# Patient Record
Sex: FEMALE | Race: OTHER | HISPANIC OR LATINO | URBAN - METROPOLITAN AREA
[De-identification: names, ages, dates, MRNs, and addresses within clinical notes are randomized per-mention and may not be internally consistent; named-entity substitution may affect disease eponyms.]

---

## 2022-04-12 ENCOUNTER — EMERGENCY (EMERGENCY)
Facility: HOSPITAL | Age: 42
LOS: 0 days | Discharge: HOME | End: 2022-04-12
Attending: EMERGENCY MEDICINE | Admitting: EMERGENCY MEDICINE
Payer: COMMERCIAL

## 2022-04-12 VITALS
OXYGEN SATURATION: 99 % | TEMPERATURE: 98 F | DIASTOLIC BLOOD PRESSURE: 67 MMHG | HEIGHT: 68 IN | HEART RATE: 87 BPM | RESPIRATION RATE: 18 BRPM | SYSTOLIC BLOOD PRESSURE: 114 MMHG | WEIGHT: 179.9 LBS

## 2022-04-12 DIAGNOSIS — Z90.710 ACQUIRED ABSENCE OF BOTH CERVIX AND UTERUS: Chronic | ICD-10-CM

## 2022-04-12 DIAGNOSIS — M54.50 LOW BACK PAIN, UNSPECIFIED: ICD-10-CM

## 2022-04-12 DIAGNOSIS — Z90.710 ACQUIRED ABSENCE OF BOTH CERVIX AND UTERUS: ICD-10-CM

## 2022-04-12 DIAGNOSIS — Z98.891 HISTORY OF UTERINE SCAR FROM PREVIOUS SURGERY: Chronic | ICD-10-CM

## 2022-04-12 LAB
ALBUMIN SERPL ELPH-MCNC: 4.6 G/DL — SIGNIFICANT CHANGE UP (ref 3.5–5.2)
ALP SERPL-CCNC: 99 U/L — SIGNIFICANT CHANGE UP (ref 30–115)
ALT FLD-CCNC: 17 U/L — SIGNIFICANT CHANGE UP (ref 0–41)
ANION GAP SERPL CALC-SCNC: 10 MMOL/L — SIGNIFICANT CHANGE UP (ref 7–14)
APPEARANCE UR: CLEAR — SIGNIFICANT CHANGE UP
AST SERPL-CCNC: 26 U/L — SIGNIFICANT CHANGE UP (ref 0–41)
BASOPHILS # BLD AUTO: 0.05 K/UL — SIGNIFICANT CHANGE UP (ref 0–0.2)
BASOPHILS NFR BLD AUTO: 0.5 % — SIGNIFICANT CHANGE UP (ref 0–1)
BILIRUB SERPL-MCNC: 0.3 MG/DL — SIGNIFICANT CHANGE UP (ref 0.2–1.2)
BILIRUB UR-MCNC: NEGATIVE — SIGNIFICANT CHANGE UP
BUN SERPL-MCNC: 8 MG/DL — LOW (ref 10–20)
CALCIUM SERPL-MCNC: 10.1 MG/DL — SIGNIFICANT CHANGE UP (ref 8.5–10.1)
CHLORIDE SERPL-SCNC: 102 MMOL/L — SIGNIFICANT CHANGE UP (ref 98–110)
CO2 SERPL-SCNC: 26 MMOL/L — SIGNIFICANT CHANGE UP (ref 17–32)
COLOR SPEC: YELLOW — SIGNIFICANT CHANGE UP
CREAT SERPL-MCNC: 0.7 MG/DL — SIGNIFICANT CHANGE UP (ref 0.7–1.5)
DIFF PNL FLD: ABNORMAL
EGFR: 111 ML/MIN/1.73M2 — SIGNIFICANT CHANGE UP
EOSINOPHIL # BLD AUTO: 0.09 K/UL — SIGNIFICANT CHANGE UP (ref 0–0.7)
EOSINOPHIL NFR BLD AUTO: 0.9 % — SIGNIFICANT CHANGE UP (ref 0–8)
EPI CELLS # UR: 7 /HPF — HIGH (ref 0–5)
GLUCOSE SERPL-MCNC: 101 MG/DL — HIGH (ref 70–99)
GLUCOSE UR QL: NEGATIVE — SIGNIFICANT CHANGE UP
HCT VFR BLD CALC: 42.9 % — SIGNIFICANT CHANGE UP (ref 37–47)
HGB BLD-MCNC: 14 G/DL — SIGNIFICANT CHANGE UP (ref 12–16)
IMM GRANULOCYTES NFR BLD AUTO: 0.2 % — SIGNIFICANT CHANGE UP (ref 0.1–0.3)
KETONES UR-MCNC: NEGATIVE — SIGNIFICANT CHANGE UP
LACTATE SERPL-SCNC: 2.6 MMOL/L — HIGH (ref 0.7–2)
LEUKOCYTE ESTERASE UR-ACNC: NEGATIVE — SIGNIFICANT CHANGE UP
LIDOCAIN IGE QN: 21 U/L — SIGNIFICANT CHANGE UP (ref 7–60)
LYMPHOCYTES # BLD AUTO: 2.25 K/UL — SIGNIFICANT CHANGE UP (ref 1.2–3.4)
LYMPHOCYTES # BLD AUTO: 22 % — SIGNIFICANT CHANGE UP (ref 20.5–51.1)
MCHC RBC-ENTMCNC: 28.3 PG — SIGNIFICANT CHANGE UP (ref 27–31)
MCHC RBC-ENTMCNC: 32.6 G/DL — SIGNIFICANT CHANGE UP (ref 32–37)
MCV RBC AUTO: 86.7 FL — SIGNIFICANT CHANGE UP (ref 81–99)
MONOCYTES # BLD AUTO: 0.8 K/UL — HIGH (ref 0.1–0.6)
MONOCYTES NFR BLD AUTO: 7.8 % — SIGNIFICANT CHANGE UP (ref 1.7–9.3)
NEUTROPHILS # BLD AUTO: 7.02 K/UL — HIGH (ref 1.4–6.5)
NEUTROPHILS NFR BLD AUTO: 68.6 % — SIGNIFICANT CHANGE UP (ref 42.2–75.2)
NITRITE UR-MCNC: NEGATIVE — SIGNIFICANT CHANGE UP
NRBC # BLD: 0 /100 WBCS — SIGNIFICANT CHANGE UP (ref 0–0)
PH UR: 6.5 — SIGNIFICANT CHANGE UP (ref 5–8)
PLATELET # BLD AUTO: 331 K/UL — SIGNIFICANT CHANGE UP (ref 130–400)
POTASSIUM SERPL-MCNC: 5.7 MMOL/L — HIGH (ref 3.5–5)
POTASSIUM SERPL-SCNC: 5.7 MMOL/L — HIGH (ref 3.5–5)
PROT SERPL-MCNC: 7.9 G/DL — SIGNIFICANT CHANGE UP (ref 6–8)
PROT UR-MCNC: SIGNIFICANT CHANGE UP
RBC # BLD: 4.95 M/UL — SIGNIFICANT CHANGE UP (ref 4.2–5.4)
RBC # FLD: 13 % — SIGNIFICANT CHANGE UP (ref 11.5–14.5)
RBC CASTS # UR COMP ASSIST: 1 /HPF — SIGNIFICANT CHANGE UP (ref 0–4)
SODIUM SERPL-SCNC: 138 MMOL/L — SIGNIFICANT CHANGE UP (ref 135–146)
SP GR SPEC: 1.01 — SIGNIFICANT CHANGE UP (ref 1.01–1.03)
UROBILINOGEN FLD QL: SIGNIFICANT CHANGE UP
WBC # BLD: 10.23 K/UL — SIGNIFICANT CHANGE UP (ref 4.8–10.8)
WBC # FLD AUTO: 10.23 K/UL — SIGNIFICANT CHANGE UP (ref 4.8–10.8)

## 2022-04-12 PROCEDURE — 99285 EMERGENCY DEPT VISIT HI MDM: CPT

## 2022-04-12 PROCEDURE — 74177 CT ABD & PELVIS W/CONTRAST: CPT | Mod: 26,MA

## 2022-04-12 RX ORDER — KETOROLAC TROMETHAMINE 30 MG/ML
15 SYRINGE (ML) INJECTION ONCE
Refills: 0 | Status: DISCONTINUED | OUTPATIENT
Start: 2022-04-12 | End: 2022-04-12

## 2022-04-12 RX ORDER — METHOCARBAMOL 500 MG/1
1500 TABLET, FILM COATED ORAL ONCE
Refills: 0 | Status: COMPLETED | OUTPATIENT
Start: 2022-04-12 | End: 2022-04-12

## 2022-04-12 RX ORDER — TIZANIDINE 4 MG/1
2 TABLET ORAL
Qty: 42 | Refills: 0
Start: 2022-04-12 | End: 2022-04-18

## 2022-04-12 RX ADMIN — Medication 15 MILLIGRAM(S): at 09:07

## 2022-04-12 RX ADMIN — METHOCARBAMOL 1500 MILLIGRAM(S): 500 TABLET, FILM COATED ORAL at 11:24

## 2022-04-12 NOTE — ED PROVIDER NOTE - NSFOLLOWUPCLINICS_GEN_ALL_ED_FT
[facility] ([facility])  Rehabilitation  [facility] [address], [address] [place] [address]  Phone: [phone]  Fax:

## 2022-04-12 NOTE — ED PROVIDER NOTE - PHYSICAL EXAMINATION
CONSTITUTIONAL: Well-developed; well-nourished  SKIN: skin exam is warm and dry,  HEAD: Normocephalic; atraumatic.  EYES: EOM intact; conjunctiva and sclera clear.  ENT: MMM, no nasal congestion  NECK: Supple; non tender.  ROM intact.  CARD: S1, S2 normal, no murmur  RESP: No wheezes, rales or rhonchi. Good air movement bilaterally  ABD: soft; non-distended; non-tender. No Rebound, No guarding  EXT: Normal ROM. No cyanosis or edema. Dp Pulses intact.   ***BACK: no TTP. Patient endorses pain while repositioning lower back while sitting or changing position to stand   NEURO: awake, alert, following commands, oriented, grossly unremarkable.   PSYCH: Cooperative, appropriate.

## 2022-04-12 NOTE — ED ADULT NURSE NOTE - OBJECTIVE STATEMENT
Pt complains of b/l lower back pain for about a week and radiating to her lower abdomen. Pt denies lifting/straining, and urinary symptoms. She took tylenol, aleve, and muscle relaxant for pain with mild relief. Will continue to monitor pt.

## 2022-04-12 NOTE — ED PROVIDER NOTE - CLINICAL SUMMARY MEDICAL DECISION MAKING FREE TEXT BOX
42-year-old female presenting with lower back pain radiating to bilateral lower abdomen.  Atraumatic.  Subjective chills/sweats.  No history of IVDA.  No numbness or focal weakness.  No other acute complaints.  Well appearing, NAD, non toxic. NCAT PERRLA EOMI neck supple non tender normal wob cta bl rrr abdomen s nt nd no rebound no guarding WWPx4 neuro non focal no midline CTL spine tenderness to palpation throughout.  Labs imaging reviewed aware of all results, given a copy of all available results, comfortable with discharge and follow-up outpatient, strict return precautions given. Endorses understanding and aware they can return at any time for further evaluation. No further questions or concerns at this time.

## 2022-04-12 NOTE — ED PROVIDER NOTE - PATIENT PORTAL LINK FT
You can access the FollowMyHealth Patient Portal offered by [facility] by registering at the following website: [email or web address]. By joining [employer]’s FollowMyHealth portal, you will also be able to view your health information using other applications (apps) compatible with our system.

## 2022-04-12 NOTE — ED PROVIDER NOTE - NSFOLLOWUPINSTRUCTIONS_ED_ALL_ED_FT
-You presented with lower back and abdominal pain. You received a CT scan of the abdomen/pelvis, which showed no acute pathology. The CT report included several incidental findings as follows: 2.2 cm left ovarian follicle/cyst; benign-appearing lucent lesion with a sclerotic rim in the right iliac bone measuring 1.4 cm; mild degenerative changes of the spine    Plan:   -tizanidine 4mg (muscle relaxant) sent to pharmacy   -[facility] rehab clinic referral for back pain   -follow up with your primary care doctor within 7-10 days  -return to ED immediately if you experience urinary incontinence, stool incontinence, significant worsening of back pain, or have other concerns

## 2022-04-12 NOTE — ED PROVIDER NOTE - OBJECTIVE STATEMENT
The patient is a 42y Female with PMH of herniated disc (~14y ago) PSH of hysterectomy and  complaining of low back pain x7d. States that she woke up in the AM 7 days ago, and then began experiencing back pain - states she thought that pain was due to sleeping position. Pt states that the pain radiates from her lower back to lower abdomen bilaterally. Pt denies recent lifting/straining, and urinary symptoms. She took tylenol, aleve, and muscle relaxant for pain with mild relief. Describes that pain as throbbing for a few seconds at a time, 9/10 severity, worsened with movement/repositioning of lower back, relieve with rest (e.g. lying down flat). Denies upper or lower extremity weakness, denies headaches, fevers, chills or recent sick contacts.

## 2022-04-12 NOTE — ED ADULT NURSE NOTE - NSIMPLEMENTINTERV_GEN_ALL_ED
Implemented All Universal Safety Interventions:  [place] to call system. Call bell, personal items and telephone within reach. Instruct patient to call for assistance. Room bathroom lighting operational. Non-slip footwear when patient is off stretcher. Physically safe environment: no spills, clutter or unnecessary equipment. Stretcher in lowest position, wheels locked, appropriate side rails in place.

## 2022-04-12 NOTE — ED PROVIDER NOTE - PROGRESS NOTE DETAILS
- Ordered CBC, CMP, lipase, lactate  - CT AP w/ IV contrast  - toradol 15mg x1 - Ordered CBC, CMP, lipase, lactate  - Ordered UA  - CT AP w/ IV contrast  - toradol 15mg x1 -muscle relaxant sent to pharmacy  -discussed CT findings and discharge plan with patient

## 2022-04-12 NOTE — ED PROVIDER NOTE - NS ED ROS FT
Constitutional:  no fevers, no chills, no malaise  Eyes:  No visual changes  ENMT: No neck pain or stiffness, no nasal congestion, no ear pain, no throat pain  Cardiac:  No chest pain  Respiratory:  No cough or sob  GI:  +lower abdominal pain as per HPI. No nausea, vomiting, diarrhea or   :  No dysuria, frequency or burning.  MS:  +back pain as per HPI. No joint pain.  Neuro:  No headache, no dizziness, no change in mental status  Skin:  No skin rash  Except as documented in the HPI,  all other systems are negative

## 2022-09-02 ENCOUNTER — APPOINTMENT (OUTPATIENT)
Dept: ORTHOPEDIC SURGERY | Facility: CLINIC | Age: 42
End: 2022-09-02
Payer: COMMERCIAL

## 2022-09-02 VITALS — HEIGHT: 68 IN | WEIGHT: 180 LBS | BODY MASS INDEX: 27.28 KG/M2

## 2022-09-02 PROBLEM — Z78.9 OTHER SPECIFIED HEALTH STATUS: Chronic | Status: ACTIVE | Noted: 2022-04-12

## 2022-09-02 PROBLEM — Z00.00 ENCOUNTER FOR PREVENTIVE HEALTH EXAMINATION: Status: ACTIVE | Noted: 2022-09-02

## 2022-09-02 PROCEDURE — 99203 OFFICE O/P NEW LOW 30 MIN: CPT

## 2022-09-02 PROCEDURE — 73030 X-RAY EXAM OF SHOULDER: CPT | Mod: LT

## 2022-09-02 RX ORDER — NAPROXEN 500 MG/1
500 TABLET ORAL
Qty: 60 | Refills: 0 | Status: ACTIVE | COMMUNITY
Start: 2022-09-02 | End: 1900-01-01

## 2022-09-02 NOTE — PHYSICAL EXAM
[Left] : left shoulder [] : motor and sensory intact distally [de-identified] :   Some pain with rotator cuff resistance testing.  Some pain with Brock's testing. [TWNoteComboBox7] : active forward flexion 90 degrees [TWNoteComboBox4] : passive forward flexion 180 degrees [de-identified] : active abduction 90 degrees [TWNoteComboBox6] : internal rotation L1

## 2022-09-02 NOTE — HISTORY OF PRESENT ILLNESS
[de-identified] : The patient is a 42-year-old female, right-hand dominant, here for an evaluation of her left shoulder.  She states a few months ago while playing baseball in the park with her children she fell, she did not land on the left shoulder at that point.  She developed left shoulder pain a few days later that resolved.  Since then she has had intermittent pain in the shoulder.  One day ago her somewhat showing her a tackle football moved, the patient went to do the technique and developed severe pain in her left shoulder.  She points anteriorly as to where the pain is.  She did not hear any snapping or popping.  She reports difficulty lifting her arm.

## 2022-09-02 NOTE — DISCUSSION/SUMMARY
[de-identified] : I reviewed the x-ray findings with the patient.  I recommend a course of formal physical therapy anti-inflammatory medication.  Rx sent to the pharmacy for naproxen.  She will start to formal physical therapy for her left shoulder.  I will see her back in 6 weeks for further evaluation.  If she is still symptomatic at that point in time I would recommend further imaging studies for the left shoulder.\par \par Supervising physician:  Dr. Mendoza

## 2022-09-02 NOTE — DATA REVIEWED
[Left] : left [Shoulder] : shoulder [FreeTextEntry1] :  Three views of the left shoulder were obtained:  There are no fractures noted, no soft tissue calcifications, no bone masses.

## 2022-09-22 ENCOUNTER — TRANSCRIPTION ENCOUNTER (OUTPATIENT)
Age: 42
End: 2022-09-22

## 2022-09-22 ENCOUNTER — APPOINTMENT (OUTPATIENT)
Dept: OTOLARYNGOLOGY | Facility: CLINIC | Age: 42
End: 2022-09-22

## 2022-09-22 VITALS — BODY MASS INDEX: 27.28 KG/M2 | WEIGHT: 180 LBS | HEIGHT: 68 IN

## 2022-09-22 PROCEDURE — 92550 TYMPANOMETRY & REFLEX THRESH: CPT

## 2022-09-22 PROCEDURE — 99204 OFFICE O/P NEW MOD 45 MIN: CPT | Mod: 25

## 2022-09-22 PROCEDURE — 92557 COMPREHENSIVE HEARING TEST: CPT

## 2022-09-22 RX ORDER — LEVOCETIRIZINE DIHYDROCHLORIDE 5 MG/1
5 TABLET ORAL DAILY
Qty: 1 | Refills: 3 | Status: ACTIVE | COMMUNITY
Start: 2022-09-22 | End: 1900-01-01

## 2022-09-22 RX ORDER — FLUTICASONE PROPIONATE 50 UG/1
50 SPRAY, METERED NASAL DAILY
Qty: 1 | Refills: 7 | Status: ACTIVE | COMMUNITY
Start: 2022-09-22 | End: 1900-01-01

## 2022-09-22 RX ORDER — METHYLPREDNISOLONE 4 MG/1
4 TABLET ORAL
Qty: 1 | Refills: 0 | Status: ACTIVE | COMMUNITY
Start: 2022-09-22 | End: 1900-01-01

## 2022-09-22 NOTE — REASON FOR VISIT
[Initial Evaluation] : an initial evaluation for [Ear Pain] : ear pain [FreeTextEntry2] : muffled hearing

## 2022-09-22 NOTE — HISTORY OF PRESENT ILLNESS
[de-identified] : Patient presents today c/o ear pain, popping and muffled sound in right ear only. Started a couple of weeks ago. No prior audio.

## 2022-10-04 ENCOUNTER — APPOINTMENT (OUTPATIENT)
Dept: OTOLARYNGOLOGY | Facility: CLINIC | Age: 42
End: 2022-10-04

## 2022-10-04 DIAGNOSIS — J31.0 CHRONIC RHINITIS: ICD-10-CM

## 2022-10-04 DIAGNOSIS — H93.8X1 OTHER SPECIFIED DISORDERS OF RIGHT EAR: ICD-10-CM

## 2022-10-04 PROCEDURE — 31231 NASAL ENDOSCOPY DX: CPT

## 2022-10-04 PROCEDURE — 99213 OFFICE O/P EST LOW 20 MIN: CPT | Mod: 25

## 2022-10-04 NOTE — PHYSICAL EXAM
[] : septum deviated bilaterally [de-identified] : edema  [Normal] : mucosa is normal [Midline] : trachea located in midline position

## 2022-10-04 NOTE — REASON FOR VISIT
[Subsequent Evaluation] : a subsequent evaluation for [FreeTextEntry2] : hearing changes . ear pressure

## 2022-10-04 NOTE — HISTORY OF PRESENT ILLNESS
[FreeTextEntry1] : Patient  returns today following up on hearing changes . ear pressure.  Has taken methylprednisolone and levocetirizine , minimal improvement. Still having  some discomfort ,still has  echo and  popping . She denies  hearing  sound  muffled .  No  recent  itching  and pain .  Pt has intermittent congestion as well and PND.

## 2022-10-04 NOTE — PROCEDURE
[Recalcitrant Symptoms] : recalcitrant symptoms  [None] : none [Flexible Endoscope] : examined with the flexible endoscope [Congested] : congested [Merlyn] : merlyn [FreeTextEntry6] : eustachian tube swelling

## 2022-11-16 ENCOUNTER — APPOINTMENT (OUTPATIENT)
Dept: ORTHOPEDIC SURGERY | Facility: CLINIC | Age: 42
End: 2022-11-16

## 2022-11-23 ENCOUNTER — APPOINTMENT (OUTPATIENT)
Dept: ORTHOPEDIC SURGERY | Facility: CLINIC | Age: 42
End: 2022-11-23

## 2022-11-23 DIAGNOSIS — M25.512 PAIN IN LEFT SHOULDER: ICD-10-CM

## 2022-11-23 PROCEDURE — 99213 OFFICE O/P EST LOW 20 MIN: CPT

## 2022-11-23 NOTE — PHYSICAL EXAM
[Left] : left shoulder [] : negative impingement testing [de-identified] : + Howard test [TWNoteComboBox7] : active forward flexion 170 degrees [TWNoteComboBox4] : passive forward flexion 180 degrees [de-identified] : active abduction 90 degrees [TWNoteComboBox6] : internal rotation L2

## 2022-11-23 NOTE — HISTORY OF PRESENT ILLNESS
[de-identified] : The patient is a 42-year-old female, right-hand dominant, here for a subsequent re-evaluation of her left shoulder.  She injured it in June 2022 when she fell on it while playing baseball.  She has been doing formal physical therapy jag 1.  She has been doing therapy since September 2022.  She has improved with range of motion but she still has pain in the shoulder on a daily basis especially with some movements.  She has had over-the-counter ibuprofen with some relief.

## 2022-11-23 NOTE — DISCUSSION/SUMMARY
[de-identified] : Today I recommend an MRI left shoulder to evaluate for a labral tear.  The patient has had formal physical therapy and anti-inflammatory medication with some improvement but still has symptoms of pain on a daily basis.  She has had more than 6 weeks of physician directed treatment.  Will call me 2 days after the MRI is performed so we can discuss results, I will see her back in 6 weeks for further evaluation.\par \par Supervising physician:

## 2022-12-14 ENCOUNTER — APPOINTMENT (OUTPATIENT)
Dept: OTOLARYNGOLOGY | Facility: CLINIC | Age: 42
End: 2022-12-14

## 2022-12-14 DIAGNOSIS — H91.90 UNSPECIFIED HEARING LOSS, UNSPECIFIED EAR: ICD-10-CM

## 2022-12-14 DIAGNOSIS — H69.81 OTHER SPECIFIED DISORDERS OF EUSTACHIAN TUBE, RIGHT EAR: ICD-10-CM

## 2022-12-14 PROCEDURE — 92550 TYMPANOMETRY & REFLEX THRESH: CPT

## 2022-12-14 PROCEDURE — 99213 OFFICE O/P EST LOW 20 MIN: CPT | Mod: 25

## 2022-12-14 NOTE — HISTORY OF PRESENT ILLNESS
[FreeTextEntry1] : Patient returns today following up on right ear pressure , dysfunction of eustachian tube .  Has  noticed improvement  since last visit ,  ear popping/ clogged sensation  is less frequent .  No  recent ear discomfort . Pt has stopped er medications. Itching has improved as well.

## 2022-12-14 NOTE — REASON FOR VISIT
[Subsequent Evaluation] : a subsequent evaluation for [FreeTextEntry2] : right ear pressure , dysfunction of eustachian tube

## 2023-03-14 ENCOUNTER — APPOINTMENT (OUTPATIENT)
Dept: ORTHOPEDIC SURGERY | Facility: CLINIC | Age: 43
End: 2023-03-14
Payer: COMMERCIAL

## 2023-03-14 DIAGNOSIS — M75.112 INCOMPLETE ROTATOR CUFF TEAR OR RUPTURE OF LEFT SHOULDER, NOT SPECIFIED AS TRAUMATIC: ICD-10-CM

## 2023-03-14 PROCEDURE — 99213 OFFICE O/P EST LOW 20 MIN: CPT

## 2023-03-14 NOTE — IMAGING
[de-identified] : Physical exam of the left shoulder: Active forward flexion to 175 degrees, active abduction to 170 degrees, active internal rotation to L2.  No tenderness to palpation over the left AC joint.  Mild tenderness to the patient over the anterior aspect of the left shoulder.  Good strength with rotator cuff resistance testing.  Intact to light touch and sensation.

## 2023-03-14 NOTE — HISTORY OF PRESENT ILLNESS
[de-identified] : The patient is a 43-year-old female here for a subsequent reevaluation of her left shoulder.  Left shoulder has improved markedly.  She states her pain is decreased in severity.  She did 3 months of formal physical therapy and has since stopped.  He does get pain with certain movements.  MRI came back showing a partial rotator cuff tear, no labral tear.

## 2023-03-14 NOTE — DISCUSSION/SUMMARY
[de-identified] : At this point she has progressed nicely with her shoulder.  We will do home therapy exercises, she was given a printout from the [employer] for a shoulder conditioning program.  I will see her back in 2 months for further evaluation.  I discussed a cortisone injection and surgical intervention for the partial rotator cuff tear if her symptoms worsen.\par \par Supervising Physician: Dr. Mendoza

## 2023-06-19 ENCOUNTER — APPOINTMENT (OUTPATIENT)
Dept: ORTHOPEDIC SURGERY | Facility: CLINIC | Age: 43
End: 2023-06-19